# Patient Record
Sex: FEMALE | Race: WHITE | NOT HISPANIC OR LATINO | Employment: OTHER
[De-identification: names, ages, dates, MRNs, and addresses within clinical notes are randomized per-mention and may not be internally consistent; named-entity substitution may affect disease eponyms.]

---

## 2023-04-17 ENCOUNTER — TRANSCRIBE ORDERS (OUTPATIENT)
Dept: OTHER | Age: 63
End: 2023-04-17

## 2023-04-17 DIAGNOSIS — M46.1 SACROILIITIS (H): Primary | ICD-10-CM

## 2023-04-28 NOTE — PROGRESS NOTES
"Physical Therapy Initial Examination/Evaluation  May 2,  2023    Vaishali Britt is a 62 year old female referred to physical therapy by Mary Lenz for treatment of sacroilitis with Precautions/Restrictions/MD instructions evaluate and treat.    Therapist Impression:   Patient has complaints of difficulty walking ongoing for almost 13 years. Recently has gotten worse. Reports R sided \"tightness\" states she has difficulty walking, stairs - goes up one at a time, tieing shoes, crossing R leg over left, raking and yard work. Pain alleviated with hot pack. massage.  Patient found to have decreased hip mobility, decreased lumbar mobility, decreased hip strength, and anteriorly rotated R innominate. Patient given HEP with hip roll, bridges and tennis ball release for glutes. Patient would benefit from skilled physical therapy in order to address the above impairments and return to PLOF.     Subjective:  DOI/onset: 4/17/23 - referral    Imaging: None  Chief Complaint/Functional Limitations:   Pain with walking, tieing shoes, yard work  and see below in therapy evaluation codes   Pain: rest 4 /10  Location: R PSIS, R glute and R quad  Frequency: Intermittent Described as: tightness, \"\"  Previous Treatment: Nothing and Rest Effect of prior treatment: fair Alleviated by: Rest and Heat Progression of Symptoms: Gradually getting worse. Time of day when pain is worse: Activity related and Position related  Sleeping: Interrupted due to current issue  - can't lay on R side   Occupation: retired  Job duties: prolonged sitting, prolonged standing, lifting/carrying  Current HEP/exercise regimen: yard work, walking - but this hurts   Patient's goals are see chief complaints walk without pain, yard work without pain     Other pertinent PMH/Red Flags: Incontinence, Migraines/Headaches, Osteoarthritis, Overweight, Pain at Night/Rest   Barriers at home/work: None as reported by patient  Pertinent Surgical History: bunions "   Medications: Anti-inflammatory  General health as reported by patient: good      Lumbar Spine Evaluation  Static Posture  Standing posture: Lumbar lordosis  Sitting posture: Good    Dynamic Movement Screen  Single leg stance observations: Difficulty with balance eyes open maura , Lateral trunk lean RLE or Contralateral hip drop LLE   Double limb squat observations: Increased trunk lean and Improper use of glutes/hips  Gait: Decreased step length LLE and Decreased stance time RLE   PSIS flexion: R PSIS hypermobile   Unilateral PSIS flexion : symmetrical and pain free   Leg length: R anteriorly rotated innominate;   Supine ASIS position : R more inferior to L     Hip Joint Screen Positive pain ER RLE; IR limited 75% maura     Trunk Range of Motion  Movement Loss Major Moderate Minimal Nil Pain   Flexion  x      Extension   x     Rotation R   x     Rotation L    x         Flexibility Left Right   Hamstrings mild mild          Hip and Knee Strength   MMT Left Right   Hip Flexion 4-/5 4-/5   Hip Extension 4/5 4/5   Hip Abduction 4/5 4/5   Hip ER 4/5 4/5   Hip IR 4-/5 4-/5     Special Tests  Spring testing: Negative  Neural tension: Negative  SI joint test: Negative compression, distraction, shear     Palpation:  Moderate tenderness to palpation at R PSIS, maura piriformis, glute med    Assessment/Plan:  Patient is a 62 year old female with lumbar and sacral complaints.    Patient has the following significant findings with corresponding treatment plan.                Diagnosis 1:  sacroiliitis   Pain -  manual therapy, self management, education and home program  Decreased ROM/flexibility - manual therapy, therapeutic exercise, therapeutic activity and home program  Decreased strength - therapeutic exercise, therapeutic activities and home program  Impaired balance - neuro re-education, therapeutic activities and home program  Decreased proprioception - neuro re-education, therapeutic activities and home program  Decreased  function - therapeutic activities and home program    Therapy Evaluation Codes:   1) History comprised of:   Personal factors that impact the plan of care:      None.    Comorbidity factors that impact the plan of care are:      Migraines/headaches, Osteoarthritis, Overweight and Pain at night/rest.     Medications impacting care: Anti-inflammatory.  2) Examination of Body Systems comprised of:   Body structures and functions that impact the plan of care:      Lumbar spine, Pelvis and Sacral illiac joint.   Activity limitations that impact the plan of care are:      Dressing, Stairs, Standing, Walking and Sleeping.  3) Clinical presentation characteristics are:   Stable/Uncomplicated.  4) Decision-Making    Low complexity using standardized patient assessment instrument and/or measureable assessment of functional outcome.  Cumulative Therapy Evaluation is: Low complexity.    Previous and current functional limitations:  (See Goal Flow Sheet for this information)    Short term and Long term goals: (See Goal Flow Sheet for this information)     Communication ability:  Patient appears to be able to clearly communicate and understand verbal and written communication and follow directions correctly.  Treatment Explanation - The following has been discussed with the patient:   RX ordered/plan of care  Anticipated outcomes  Possible risks and side effects  This patient would benefit from PT intervention to resume normal activities.   Rehab potential is good.    Frequency:  2 X a month, once daily  Duration:  for 3 months  Discharge Plan:  Achieve all LTG.  Independent in home treatment program.  Reach maximal therapeutic benefit.    Please refer to the daily flowsheet for treatment today, total treatment time and time spent performing 1:1 timed codes.

## 2023-05-02 ENCOUNTER — THERAPY VISIT (OUTPATIENT)
Dept: PHYSICAL THERAPY | Facility: CLINIC | Age: 63
End: 2023-05-02
Payer: COMMERCIAL

## 2023-05-02 DIAGNOSIS — M46.1 SACROILIITIS (H): ICD-10-CM

## 2023-05-02 DIAGNOSIS — M25.551 PAIN IN JOINT, PELVIC REGION AND THIGH, RIGHT: ICD-10-CM

## 2023-05-02 PROCEDURE — 97110 THERAPEUTIC EXERCISES: CPT | Mod: GP

## 2023-05-02 PROCEDURE — 97140 MANUAL THERAPY 1/> REGIONS: CPT | Mod: GP

## 2023-05-02 PROCEDURE — 97161 PT EVAL LOW COMPLEX 20 MIN: CPT | Mod: GP

## 2023-05-02 NOTE — LETTER
"[facility]  [address] [address] [place] [id number]  [phone]    May 2, 2023    Re: Vaishali Britt   :   1960  MRN:  [id number]   REFERRING PHYSICIAN:   Mary CIFUENTES [facility]    Date of Initial Evaluation:  2023  Visits:  Rxs Used: 1  Reason for Referral:     Sacroiliitis (H)  Pain in joint, pelvic region and thigh, right    EVALUATION SUMMARY    Physical Therapy Initial Examination/Evaluation  May 2,  2023    Vaishali Britt is a 62 year old female referred to physical therapy by Mary Lenz for treatment of sacroilitis with Precautions/Restrictions/MD instructions evaluate and treat.    Therapist Impression:   Patient has complaints of difficulty walking ongoing for almost 13 years. Recently has gotten worse. Reports R sided \"tightness\" states she has difficulty walking, stairs - goes up one at a time, tieing shoes, crossing R leg over left, raking and yard work. Pain alleviated with hot pack. massage.  Patient found to have decreased hip mobility, decreased lumbar mobility, decreased hip strength, and anteriorly rotated R innominate. Patient given HEP with hip roll, bridges and tennis ball release for glutes. Patient would benefit from skilled physical therapy in order to address the above impairments and return to PLOF.     Subjective:  DOI/onset: 23 - referral    Imaging: None  Chief Complaint/Functional Limitations:   Pain with walking, tieing shoes, yard work  and see below in therapy evaluation codes   Pain: rest 4 /10  Location: R PSIS, R glute and R quad  Frequency: Intermittent Described as: tightness, \"\"  Previous Treatment: Nothing and Rest Effect of prior treatment: fair Alleviated by: Rest and Heat Progression of Symptoms: Gradually getting worse. Time of day when pain is worse: Activity related and Position related  Sleeping: Interrupted due to current " issue  - can't lay on R side   Occupation: retired  Job duties: prolonged sitting, prolonged standing, lifting/carrying  Current HEP/exercise regimen: yard work, walking - but this hurts   Patient's goals are see chief complaints walk without pain, yard work without pain       Re: Vaishali Britt   :   1960  MRN:  [id number]     Other pertinent PMH/Red Flags: Incontinence, Migraines/Headaches, Osteoarthritis, Overweight, Pain at Night/Rest   Barriers at home/work: None as reported by patient  Pertinent Surgical History: bunions   Medications: Anti-inflammatory  General health as reported by patient: good      Lumbar Spine Evaluation  Static Posture  Standing posture: Lumbar lordosis  Sitting posture: Good    Dynamic Movement Screen  Single leg stance observations: Difficulty with balance eyes open maura , Lateral trunk lean RLE or Contralateral hip drop LLE   Double limb squat observations: Increased trunk lean and Improper use of glutes/hips  Gait: Decreased step length LLE and Decreased stance time RLE   PSIS flexion: R PSIS hypermobile   Unilateral PSIS flexion : symmetrical and pain free   Leg length: R anteriorly rotated innominate;   Supine ASIS position : R more inferior to L     Hip Joint Screen Positive pain ER RLE; IR limited 75% maura     Trunk Range of Motion  Movement Loss Major Moderate Minimal Nil Pain   Flexion  x      Extension   x     Rotation R   x     Rotation L    x       Flexibility Left Right   Hamstrings mild mild          Hip and Knee Strength   MMT Left Right   Hip Flexion 4-/5 4-/5   Hip Extension 4/5 4/5   Hip Abduction 4/5 4/5   Hip ER 4/5 4/5   Hip IR 4-/5 4-/5     Special Tests  Spring testing: Negative  Neural tension: Negative  SI joint test: Negative compression, distraction, shear     Re: Vaishali Britt   :   1960  MRN:  [id number]     Palpation:  Moderate tenderness to palpation at R PSIS, maura piriformis, glute med    Assessment/Plan:  Patient is a 62 year old  female with lumbar and sacral complaints.    Patient has the following significant findings with corresponding treatment plan.                Diagnosis 1:  sacroiliitis   Pain -  manual therapy, self management, education and home program  Decreased ROM/flexibility - manual therapy, therapeutic exercise, therapeutic activity and home program  Decreased strength - therapeutic exercise, therapeutic activities and home program  Impaired balance - neuro re-education, therapeutic activities and home program  Decreased proprioception - neuro re-education, therapeutic activities and home program  Decreased function - therapeutic activities and home program    Therapy Evaluation Codes:   History comprised of:   Personal factors that impact the plan of care:  None.    Comorbidity factors that impact the plan of care are: Migraines/headaches, Osteoarthritis, Overweight and Pain at night/rest. Medications impacting care: Anti-inflammatory.  Examination of Body Systems comprised of: Body structures and functions that impact the plan of care:  Lumbar spine, Pelvis and Sacral illiac joint.   Activity limitations that impact the plan of care are: Dressing, Stairs, Standing, Walking and Sleeping.  Clinical presentation characteristics are: Stable/Uncomplicated.  Decision-Making Low complexity using standardized patient assessment instrument and/or measureable assessment of functional outcome.    Cumulative Therapy Evaluation is: Low complexity.    Previous and current functional limitations:  (See Goal Flow Sheet for this information)    Short term and Long term goals: (See Goal Flow Sheet for this information)     Communication ability:  Patient appears to be able to clearly communicate and understand verbal and written communication and follow directions correctly.  Treatment Explanation - The following has been discussed with the patient:   RX ordered/plan of care  Anticipated outcomes  Possible risks and side effects  This  patient would benefit from PT intervention to resume normal activities.   Rehab potential is good.    Frequency:  2 X a month, once daily  Duration:  for 3 months  Discharge Plan:  Achieve all LTG.  Independent in home treatment program.  Reach maximal therapeutic benefit.      Re: Vaishali Britt   :   1960  MRN:  [id number]     Please refer to the daily flowsheet for treatment today, total treatment time and time spent performing 1:1 timed codes.       Thank you for your referral.    INQUIRIES  Therapist: Selena dAen DPT   [address] [address]  Phone: [phone]  Fax: [phone]

## 2023-05-09 ENCOUNTER — THERAPY VISIT (OUTPATIENT)
Dept: PHYSICAL THERAPY | Facility: CLINIC | Age: 63
End: 2023-05-09
Payer: COMMERCIAL

## 2023-05-09 DIAGNOSIS — M25.551 PAIN IN JOINT, PELVIC REGION AND THIGH, RIGHT: ICD-10-CM

## 2023-05-09 DIAGNOSIS — M46.1 SACROILIITIS (H): Primary | ICD-10-CM

## 2023-05-09 PROCEDURE — 97140 MANUAL THERAPY 1/> REGIONS: CPT | Mod: GP

## 2023-05-09 PROCEDURE — 97110 THERAPEUTIC EXERCISES: CPT | Mod: GP

## 2023-05-23 ENCOUNTER — THERAPY VISIT (OUTPATIENT)
Dept: PHYSICAL THERAPY | Facility: CLINIC | Age: 63
End: 2023-05-23
Payer: COMMERCIAL

## 2023-05-23 DIAGNOSIS — M46.1 SACROILIITIS (H): Primary | ICD-10-CM

## 2023-05-23 DIAGNOSIS — M25.551 PAIN IN JOINT, PELVIC REGION AND THIGH, RIGHT: ICD-10-CM

## 2023-05-23 PROCEDURE — 97140 MANUAL THERAPY 1/> REGIONS: CPT | Mod: GP

## 2023-05-23 PROCEDURE — 97110 THERAPEUTIC EXERCISES: CPT | Mod: GP

## 2023-09-12 NOTE — PROGRESS NOTES
05/23/23 0500   Appointment Info   Signing clinician's name / credentials Selena Aden, PT, DPT   Total/Authorized Visits 6   Visits Used 3   Medical Diagnosis sacroilitis   PT Tx Diagnosis sacroilitis   Progress Note/Certification   Onset of illness/injury or Date of Surgery 04/17/23  (referral)   Therapy Frequency every other week   Predicted Duration 12 weeks; 6 visits   PT Goal 1   Goal Identifier goal 1   Goal Description patient will ambulate on uneven ground for 20 min in order to garden at home safely and pain free;   Rationale to maximize safety and independence within the home;to maximize safety and independence with performance of ADLs and functional tasks   Goal Progress gardened last week with increased pain   Target Date 08/02/23   Subjective Report   Subjective Report patient reports last week she was sore after doing yard work which made HEP difficult to do. She was out of town last 5 days and in car so not able to do HEP with that as well. Notices decreased overall central low back pain.   Objective Measures   Objective Measures Objective Measure 1   Objective Measure 1   Objective Measure R anteriorly rotated innominate; improved with MET not corrected. mod tension in R piriformis and glute med   Treatment Interventions (PT)   Interventions Therapeutic Procedure/Exercise;Manual Therapy   Therapeutic Procedure/Exercise   PTRx Ther Proc 1 Supine Lumbar Hip Roll   PTRx Ther Proc 1 - Details x 10 with 5 sec hold. cued to keep hips on table;    PTRx Ther Proc 2 Bridging #1   PTRx Ther Proc 2 - Details x 10 cued to push through heels and squeeze glutes; cues to maintain knee/ankle/hip alignment    PTRx Ther Proc 3 Gluteal Myofascial Full Arc   PTRx Ther Proc 3 - Details x 10 LLE x 12RLE; tc at hips to prevent posterior movement; reports feeling groin fatigue with this    PTRx Ther Proc 4 Gluteal Myofascial Upper Arc   PTRx Ther Proc 4 - Details x 10 LLE x10 RLE ; tc at hips to prevent posterior  movement;    PTRx Ther Proc 5 Gluteal Myofascial Lower Arc   PTRx Ther Proc 5 - Details x 10 LLE x  15 RLE    PTRx Ther Proc 6 Gluteal Myofascial Piriformis Cruncher   PTRx Ther Proc 6 - Details x 10 LLE x 10 RLE; reports fatigue in piriformis with this vc to prevent posterior hip substitution    PTRx Ther Proc 7 Sidestep   PTRx Ther Proc 7 - Details x 3 min; cued for mini squat weight through heels; added GTB above knees for increased glute activation; cued to sit back like sitting into chair to improve positioning   Therapeutic Procedures: strength, endurance, ROM, flexibillity minutes (30359) 23   Manual Therapy   Manual Therapy: Mobilization, MFR, MLD, friction massage minutes ([id number]) 15   Manual Therapy Manual Therapy 2   Manual Therapy 1 TPR: R piriformis, glute med x 5 min   Skilled Intervention performed to improve pelvic alignment and release tension in glutes   Manual Therapy 2 MET: correction of R anteriorly rotated innominate R hip extensors 5 sec hold  x 4, L hip flexors 5 sec hold x 4, pelvic shotogun abd/add 5 sec hold x 3 m   Manual Therapy 2 - Details decreased amount of anteiror rotation- still demonstrated some anterior rotation on R side after MET   Education   Learner/Method Patient   Education Comments reviewed tennis ball release for at home relief   Plan   Home program no changes to HEP, reviewed   Plan for next session continue working on pelvic stability, work on gait mechanics   Total Session Time   Timed Code Treatment Minutes 38   Total Treatment Time (sum of timed and untimed services) 38           DISCHARGE  Reason for Discharge: Patient has failed to schedule further appointments.    Equipment Issued: none     Discharge Plan: Patient to continue home program.    Referring Provider:  Mary Lenz